# Patient Record
Sex: MALE | URBAN - METROPOLITAN AREA
[De-identification: names, ages, dates, MRNs, and addresses within clinical notes are randomized per-mention and may not be internally consistent; named-entity substitution may affect disease eponyms.]

---

## 2022-12-12 ENCOUNTER — OFFICE VISIT (OUTPATIENT)
Dept: URBAN - METROPOLITAN AREA CLINIC 6 | Facility: CLINIC | Age: 67
End: 2022-12-12
Payer: MEDICARE

## 2022-12-12 DIAGNOSIS — H53.2 DIPLOPIA: ICD-10-CM

## 2022-12-12 DIAGNOSIS — E11.9 DIABETES MELLITUS TYPE 2 WITHOUT MENTION OF COMPLICATION: Primary | ICD-10-CM

## 2022-12-12 DIAGNOSIS — H25.813 COMBINED FORMS OF AGE-RELATED CATARACT, BILATERAL: ICD-10-CM

## 2022-12-12 PROCEDURE — 99204 OFFICE O/P NEW MOD 45 MIN: CPT | Performed by: OPTOMETRIST

## 2022-12-12 ASSESSMENT — INTRAOCULAR PRESSURE
OD: 12
OS: 10

## 2022-12-12 NOTE — IMPRESSION/PLAN
Impression: Diplopia: H53.2. Plan: Resolving diplopia. 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 Discussed DM, HTN and cholesterol as etiology of microvascular infarct to cranial nerve. Plan to follow up in 6 months.

## 2022-12-12 NOTE — IMPRESSION/PLAN
Impression: Diabetes mellitus Type 2 without mention of complication: Q08.7. Plan: Diabetes Mellitus Type II without signs of diabetic retinopathy either eye - Discussed the pathophysiology of diabetes and its effect on the eye. Stressed the importance of strong glucose control. Advised of importance of at least annual dilated examinations, and to contact us immediately for any problems or concerns.